# Patient Record
Sex: MALE | Race: WHITE | Employment: STUDENT | URBAN - NONMETROPOLITAN AREA
[De-identification: names, ages, dates, MRNs, and addresses within clinical notes are randomized per-mention and may not be internally consistent; named-entity substitution may affect disease eponyms.]

---
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
## 2024-04-23 ENCOUNTER — APPOINTMENT (OUTPATIENT)
Dept: GENERAL RADIOLOGY | Age: 19
End: 2024-04-23
Attending: NURSE PRACTITIONER
Payer: OTHER MISCELLANEOUS

## 2024-04-23 ENCOUNTER — HOSPITAL ENCOUNTER (OUTPATIENT)
Age: 19
Discharge: HOME | End: 2024-04-23
Payer: OTHER MISCELLANEOUS

## 2024-04-23 VITALS
RESPIRATION RATE: 16 BRPM | WEIGHT: 205 LBS | HEART RATE: 64 BPM | OXYGEN SATURATION: 99 % | HEIGHT: 73 IN | DIASTOLIC BLOOD PRESSURE: 65 MMHG | BODY MASS INDEX: 27.17 KG/M2 | TEMPERATURE: 99 F | SYSTOLIC BLOOD PRESSURE: 118 MMHG

## 2024-04-23 DIAGNOSIS — S93.402A SPRAIN OF LEFT ANKLE, UNSPECIFIED LIGAMENT, INITIAL ENCOUNTER: Primary | ICD-10-CM

## 2024-04-23 PROCEDURE — 99213 OFFICE O/P EST LOW 20 MIN: CPT | Performed by: NURSE PRACTITIONER

## 2024-04-23 PROCEDURE — A6449 LT COMPRES BAND >=3" <5"/YD: HCPCS | Performed by: NURSE PRACTITIONER

## 2024-04-23 PROCEDURE — 73610 X-RAY EXAM OF ANKLE: CPT | Performed by: NURSE PRACTITIONER

## 2024-04-23 NOTE — ED PROVIDER NOTES
Patient Seen in: [facility]      History     Chief Complaint   Patient presents with    Leg or Foot Injury     Stated Complaint: ANKLE PAIN    Subjective:   HPI    Patient is a pleasant 18-year-old male here for evaluation of left ankle injury.  Injury was sustained today at work when he rolled his ankle in an inversion mechanism of injury.  After sitting for a few minutes after the injury, he was able to bear weight.  Pain is localized along the lateral aspect of left ankle with weightbearing activity, extension and eversion.    Has not taken over-the-counter medication for pain.  Denies history of fracture or injury to left ankle    Objective:   Past Medical History:    Abdominal pain, unspecified site    Allergic rhinitis, cause unspecified    Hypertonicity of bladder    Unspecified asthma(493.90)    Urinary frequency    Viral warts, unspecified              History reviewed. No pertinent surgical history.             No pertinent social history.            Review of Systems    Positive for stated complaint: ANKLE PAIN  Other systems are as noted in HPI.  Constitutional and vital signs reviewed.      All other systems reviewed and negative except as noted above.    Physical Exam     ED Triage Vitals [04/23/24 1837]   /71   Pulse 75   Resp 18   Temp 98 °F (36.7 °C)   Temp src Temporal   SpO2 98 %   O2 Device None (Room air)       Current:/65   Pulse 64   Temp 98.8 °F (37.1 °C) (Temporal)   Resp 16   Ht 185.4 cm (6' 1\")   Wt 93 kg   SpO2 99%   BMI 27.05 kg/m²         Physical Exam  Vitals and nursing note reviewed.   Constitutional:       General: He is not in acute distress.     Appearance: Normal appearance. He is not ill-appearing, toxic-appearing or diaphoretic.   HENT:      Mouth/Throat:      Mouth: Mucous membranes are moist.   Eyes:      Conjunctiva/sclera: Conjunctivae normal.      Pupils: Pupils are equal, round, and reactive to light.   Cardiovascular:      Pulses: Normal  pulses.   Pulmonary:      Effort: Pulmonary effort is normal.   Musculoskeletal:      Left lower leg: No swelling.      Left ankle: Swelling and ecchymosis present. Tenderness present over the posterior TF ligament. Decreased range of motion. Normal pulse.      Left Achilles Tendon: Normal.   Neurological:      Mental Status: He is alert.             ED Course   Labs Reviewed - No data to display  XR ANKLE (MIN 3 VIEWS), LEFT (CPT=73610)    Result Date: 4/23/2024  CONCLUSION:  Negative for acute fracture.   LOCATION:  [facility]   Dictated by (CST): Jose Eduardo Mac MD on 4/23/2024 at 7:27 PM     Finalized by (CST): Jose Eduardo Mac MD on 4/23/2024 at 7:28 PM                  [facility]                                       Medical Decision Making  Differentials include but are not limited to sprain and fracture.  Left ankle x-ray negative for fracture, I do not observe obvious fracture or dislocation.  ACE applied.  Plan for conservative treatment including rest, ice, compression and elevation.  Work note given until cleared by occupational health if follow-up indicated.  Encouraged use of NSAID, patient declined dose here.  Ice liberally.  Monitoring parameters and follow-up precautions reviewed with patient whom agrees with plan of care.  All questions answered to patient's satisfaction.    Amount and/or Complexity of Data Reviewed  Radiology: ordered and independent interpretation performed. Decision-making details documented in ED Course.        Disposition and Plan     Clinical Impression:  1. Sprain of left ankle, unspecified ligament, initial encounter         Disposition:  Discharge  4/23/2024  7:35 pm    Follow-up:  [address]  [facility] [address] [phone]  Schedule an appointment as soon as possible for a visit             Medications Prescribed:  Current Discharge Medication List

## 2025-04-29 ENCOUNTER — TELEPHONE (OUTPATIENT)
Dept: FAMILY MEDICINE CLINIC | Facility: CLINIC | Age: 20
End: 2025-04-29

## 2025-04-29 NOTE — TELEPHONE ENCOUNTER
Calling patient about Dr. Ross's care home.    Spoke with mother, Zuleyka.  She will speak with him about this and have him call and schedule with probably Dr. Briceno.

## (undated) NOTE — LETTER
Name:  Vikash Rodolfo Year:  12th Grade Class: Student ID No.:   Address:  [address] [place] [address] Phone:  [phone] (home)  : [birth year]5/ 15year old   Name Relationship Lgl CtraJarvis [place] 3 Work Phone Home Phone Mobile Phone   1.  MANISHA ACOSTA ventricular cardiomyopathy, long QT syndrome, short QT syndrome, Brugada syndrome, or catecholaminergic polymorphic ventricular tachycardia? No   15. Does anyone in your family have a heart problem, pacemaker, or implanted defibrillator? No   16.  Has anyon headache, or memory problems? No   36. Do you have a history of seizure disorder? No   37. Do you have headaches with exercise? No   38. Have you ever had numbness, tingling, or weakness in your arms or legs after being hit or falling? No   39. Have you gabriela on BMI available as of 8/3/2021.  male    Vision: R 20/70          L 20/50          BOTH 20/30          Uncorrected   MEDICAL NORMAL ABNORMAL FINDINGS   Appearance:  Marfan stigmata (kyphoscoliosis, high-arched palate, pectus excavatum,      arachnodactyly, students only)   0269-7285 school term    As a prerequisite to participation in [employer] activities, we agree that I/our student will not use performance-enhancing substances as defined in the [place]

## (undated) NOTE — LETTER
Date & Time: 8/8/2020, 2:41 PM  Patient: Jurgen Hannon  Encounter Provider(s):    Yomaira Rashid PA-C       To Whom It May Concern:    Kylie Urbano was seen and treated in our department on 8/8/2020.  He should not return to work until cleared by o

## (undated) NOTE — LETTER
[facility] [employer] of Child Health Examination       Student's Name  Jun Olmstead Date     Signature                                                                                                                                              Title                           Date    (If adding dates to the above immun ALLERGIES  (Food, drug, insect, other)  Sulfamethoxazole W/Trimethoprim and Bactrim MEDICATION  (List all prescribed or taken on a regular basis.)  No current outpatient medications on file. Diagnosis of asthma?   Child wakes during the night coughing   Y °C) (Temporal)   Resp 18   Ht 6' (1.829 m)   Wt 159 lb   SpO2 98%   BMI 21.56 kg/m²     DIABETES SCREENING  BMI>85% age/sex  {YES_NO:585::\"No\"} And any two of the following:  Family History {YES_NO:585::\"No\"}    Ethnic Minority  {YES_NO:585::\"No\"} {YES:829::\"Yes\"}  LMP   Nose {YES:829::\"Yes\"}  Neurological {YES:829::\"Yes\"}    Throat {YES:829::\"Yes\"}  Musculoskeletal {YES:829::\"Yes\"}    Mouth/Dental {YES:829::\"Yes\"}  Spinal examination {YES:829::\"Yes\"}    Cardiovascular/HTN {YES:829::\" 8/3/2021   Address/Phone  [facility], [place] [id number] [phone]   Rev 11/15                                                                    Printed by the Candis W Shirley [employer]not[employer] o

## (undated) NOTE — LETTER
Name:  Pilar Kaiser Year:  9th Grade Class: Student ID No.:   Address:  [address]  Phone:  [phone] (home)  : [birth year]5/ 14year old   Name Relationship Lgl Ctra. Surya 3 Work Phone Home Phone Mobile Phone   1.  RAGHU ACOSTA HEART HEALTH QUESTIONS ABOUT YOUR FAMILY    15. Has any family member or relative  of heart problems or had an unexpected or unexplained sudden death before age 48? (including drowning, unexplained car accident, or sudden infant death syndrome)?  No   1 27. Do you have a groin pain or a painful bulge or hernia in the groin area? No   31. Have you had infectious mono within the last month? No   32. Do you have any rashes, pressure sores, or other skin problems? No   33.  Have you had a herpes or MRSA skin i Signature of parent/guardian: __________________________________________   Date:6/4/2018         EXAMINATION   BP (!) 82/62 (BP Location: Right arm, Patient Position: Sitting, Cuff Size: adult)   Pulse 72   Temp 98.3 °F (36.8 °C) (Temporal)   Resp 12   Ht Advanced Nurse Practitioner's Signature*     Arnaud Willams DO   *effective January 2003, the [facility] Board of Directors approved a recommendation, consistent with the [facility] [employer], that allows [employer] or Advanced Nurse Practitioners to s Society for [address] [place] [address], [address] [place], [place] [address] [place] [address] [employer]. Permission granted to reprint for noncommercial, educational purposes with acknowledgment.    [id number]

## (undated) NOTE — LETTER
Name:  Michell Tyson Year:  10th grade Class: Student ID No.:   Address:  [address] [place] [address] Phone:  [phone] (home)  : / 12year old   Name Relationship Lgl CtraJarvis [place] 3 Work Phone Home Phone Mobile Phone   1.  RAGHU ACOSTA syndrome, arrhythmogenic right ventricular cardiomyopathy, long QT syndrome, short QT syndrome, Brugada syndrome, or catecholaminergic polymorphic ventricular tachycardia? No   15.  Does anyone in your family have a heart problem, pacemaker, or implanted de 35. Have you ever had a hit or blow to the head that caused confusion, prolonged headache, or memory problems? No   36. Do you have a history of seizure disorder? No   37. Do you have headaches with exercise? No   38.  Have you ever had numbness, tingling, (Z= -1.22) based on CDC (Boys, 2-20 Years) BMI-for-age based on BMI available as of 2/4/2020.  male    Vision: R 20/25          L 20/25          BOTH 20/25        un Corrected   MEDICAL NORMAL ABNORMAL FINDINGS   Appearance:  Marfan stigmata (kyphoscoliosis [facility] Substance Testing Policy Consent to Random Testing   (This section for high school students only)   7910-6783 school term    As a prerequisite to participation in [employer] activities, we agree that I/our student will not use performance-enhancing

## (undated) NOTE — LETTER
[facility] [employer] of Child Health Examination       Student's Name  Brian Waite Birth D Date     Signature                                                                                                                                              Title                           Date    (If adding dates to the above immu ALLERGIES  (Food, drug, insect, other)  Bactrim MEDICATION  (List all prescribed or taken on a regular basis.)    Current Outpatient Prescriptions:   •  ascorbic acid, VITAMIN C, 250 MG Oral Tab, Take 1 tablet (250 mg total) by mouth 2 (two) times daily.  w Date     PHYSICAL EXAMINATION REQUIREMENTS    Entire section below to be completed by MD/DO/APN/PA       PHYSICAL EXAMINATION REQUIREMENTS (head circumference if <33 years old):   BP (!) 82/62 (BP Location: Right a Ears Yes                      Screen result: Gastrointestinal Yes    Eyes Yes     Screen result:   Genito-Urinary Yes  LMP   Nose Yes  Neurological Yes    Throat Yes  Musculoskeletal Yes    Mouth/Dental Yes  Spinal examination Yes    Cardiovascular/HTN Yes Rev 11/15                                                                    Printed by the [employer]

## (undated) NOTE — LETTER
Date & Time: 4/23/2024, 7:37 PM  Patient: Brian Waite  Encounter Provider(s):    Tamara Almaraz APRN       To Whom It May Concern:    Brian Waite was seen and treated in our department on 4/23/2024. He can return to work with these limitations: limit weight bearing activity until cleared by occupational health department .    If you have any questions or concerns, please do not hesitate to call.        _____________________________  Physician/APC Signature

## (undated) NOTE — LETTER
[facility] [employer] Child [employer] Examination       Student's Name  Lynda Olmstead Date     Signature                                                                                                                                              Title                           Date    (If adding dates to the above immun ALLERGIES  (Food, drug, insect, other)  Sulfamethoxazole W/Trimethoprim and Bactrim MEDICATION  (List all prescribed or taken on a regular basis.)  No current outpatient medications on file. Diagnosis of asthma?   Child wakes during the night coughing   Y °C) (Temporal)   Resp 18   Ht 6' (1.829 m)   Wt 159 lb   SpO2 98%   BMI 21.56 kg/m²     DIABETES SCREENING  BMI>85% age/sex  {YES_NO:585::\"No\"} And any two of the following:  Family History {YES_NO:585::\"No\"}    Ethnic Minority  {YES_NO:585::\"No\"} {YES:829::\"Yes\"}  LMP   Nose {YES:829::\"Yes\"}  Neurological {YES:829::\"Yes\"}    Throat {YES:829::\"Yes\"}  Musculoskeletal {YES:829::\"Yes\"}    Mouth/Dental {YES:829::\"Yes\"}  Spinal examination {YES:829::\"Yes\"}    Cardiovascular/HTN {YES:829::\" 8/3/2021   Address/Phone  [facility], [place] [id number] [phone]   Rev 11/15                                                                    Printed by the [employer] o

## (undated) NOTE — LETTER
02/01/18      To the Parents of  Juana Ragsdale      Dear Mr and Mrs Tahira Monroe,    [address] [facility] records indicate that you have outstanding lab work and or testing that was ordered for you and has not yet been completed:  Lab